# Patient Record
(demographics unavailable — no encounter records)

---

## 2025-05-02 NOTE — PHYSICAL EXAM
[TextEntry] : GENERAL: Appears in no acute distress  HEENT: EOMI, PERRLA. No conjunctival erythema. Moist mucous membranes. No nasopharyngeal ulcers  NECK: Supple, no cervical lymphadenopathy, no thyromegaly  CARDIOVASCULAR: RRR. S1, S2 auscultated. No murmurs or rubs.  PULMONARY: Clear to auscultation b/l, no wheezes, rales, or crackles  ABDOMINAL: Soft, nontender, nondistended. Bowel sounds present. No organomegaly.  MSK:  No active synovitis, swelling, erythema, or warmth.  No joint tenderness to palpation.  Positive Finkelstein's test of L hand Positive L shoulder RCT maneuvers Trigger fingers of L1st digit and 3rd digit palpable SKIN: No lesions or rashes  NEURO: No focal deficits. PSYCH: AAOx3. Normal affect and thought process.

## 2025-05-02 NOTE — REASON FOR VISIT
[Initial Evaluation] : an initial evaluation [Language Line ] : provided by Language Line   [Interpreters_IDNumber] : [id number] [Interpreters_FullName] : Osmany [TWNoteComboBox1] : [place]

## 2025-05-02 NOTE — HISTORY OF PRESENT ILLNESS
[FreeTextEntry1] : 49 y/o female w/ DM referred to rheumatology for pain in shoulder and hand. Pt has been having pain in b/l hands, 1st thumb, L shoulder x 8 months. Pt reports dropping things due to the issue. Pt had XRs in hospital but does not know results. Pt has not been given any meds for pain. Pt does housework regularly.

## 2025-05-02 NOTE — ASSESSMENT
[FreeTextEntry1] : 49 y/o female w/ DM referred to rheumatology for pain in shoulder and hand. Pt has been having pain in b/l hands, 1st thumb, L shoulder x 8 months. Pt reports dropping things due to the issue. Pt had XRs in hospital but does not know results. Pt has not been given any meds for pain. Pt does housework regularly.  Patient has L shoulder RCT, L wrist de Quervein's tenosynovitis, and L 1st and 3rd digit trigger fingers by exam. Treatment is complicated by her diabetes.  - L 1st and 3rd digits trigger finger injections today. See procedure note. - Advised to watch her diabetes given that steroid can increase glucose levels in blood. - Refer to ortho hand for continued evaluation and treatment of her trigger fingers, de Quervein's, and L shoulder RCT - For now, OTC NSAIDs and Tylenol PRN pains. - RTC PRN

## 2025-05-02 NOTE — PROCEDURE
[Today's Date:] : Date: [unfilled] [Risks] : risks [Benefits] : benefits [Alternatives] : alternatives [Consent Obtained] : written consent was obtained prior to the procedure and is detailed in the patient's record [Patient] : Prior to the start of the procedure a time out was taken and the identity of the patient was confirmed via name and date of birth with the patient. The correct site and the procedure to be performed were confirmed. The correct side was confirmed if applicable. The availability of the correct equipment was verified [Therapeutic] : therapeutic [#1 Site: ______] : #1 site identified in the [unfilled] [Ethyl Chloride] : ethyl chloride [Alcohol] : alcohol [Tolerated Well] : the patient tolerated the procedure well [No Complications] : there were no complications [Instructions Given] : handouts/patient instructions were given to patient [Patient Instructed to Call] : patient was instructed to call if redness at site, a decrease in range of motion or an increase in pain is noted after procedure. [de-identified] : 30 gauge 1 inch [de-identified] : 0.5ml (20mg) methylprednisolone, 0.5ml 1% lidocaine

## 2025-05-05 NOTE — PROCEDURE
[FreeTextEntry1] :  Verbal consent given after informed discussion regarding risks, benefits and alternatives to a corticosteroid injection.  Risks including infection, flare reaction, pain, and skin changes, fat atrophy, depigmentation, elevation in blood glucose were discussed in detail.  The left wrist was confirmed to be the correct site.  The skin was anesthetized using of the chloride solution spray and prepped with alcohol.  An injection was performed using 6 mg of betamethasone mixed with 1 cc of 1% lidocaine into the left wrist first dorsal compartment. the cyst was punctured upon doing so.  The patient tolerated the procedure well.  Advised to ice and elevate tonight and that the full effects can take 48 to 72 hours.

## 2025-05-05 NOTE — HISTORY OF PRESENT ILLNESS
[FreeTextEntry1] : 48-year-old female presenting for evaluation left wrist left thumb and left middle finger pain.  She states is been ongoing for approximately 2 months.  She was seen by rheumatologist 3 days ago she was given injection for her left thumb and middle finger trigger fingers.  She reports does not have any significant relief at this time..  She also reports pain on her radial aspect of her left wrist.  She reports he has a cyst in this region as well.  She is otherwise healthy.  There is no history of injury or trauma.  She does have a history of diabetes.  She is a diabetic pump.  She did not have elevations in her blood glucose after her prior injections  Today's exam accomplished with the aid of a , Rasheeda Ceron

## 2025-05-05 NOTE — ASSESSMENT
[FreeTextEntry1] : 1.  Left de Quervain's tenosynovitis with overlying ganglion cyst -We reviewed de Quervain's tenosynovitis and different treatment options.  We discussed how it is related to inflammation of the first dorsal compartment.  Given the acuity and treatment thus far I recommended bracing with a thumb spica brace to wear when at rest or at night for a 3 to 4-week duration, anti-inflammatories.  We also discussed the role of corticosteroid injection and associated risks benefits, and alternatives. - She underwent corticosteroid injection as well as puncture of the cyst we discussed there is a very high recurrence rate with puncture of the cyst alone - She is advised to check her blood sugar tonight she had does have a pump - She was given a thumb spica brace to wear at night  2.  Left thumb and middle finger trigger fingers - Patient have any triggering on my examination today.  She does have pain over the A1 pulleys.  She did have a corticosteroid injection 3 days ago.  We discussed that symptoms for improvement after injection can take up to a week to see improvement.  I recommend observation at this time.  Follow-up 6-week

## 2025-05-05 NOTE — PHYSICAL EXAM
[de-identified] : Left hand and wrist  -Hand and digits warm well-perfused, no abrasion, redness or drainage - mild swelling over 1st dorsal compartment, there is a palpable ganglion cyst superficially over the compartment -No tenderness over thumb CMC, negative CMC grind,  -Positive Finkelstein's test, pain over the first dorsal compartment. -Patient able to make full composite fist.  Denies numbness or tingling in the radial, median, ulnar nerve distributions  Mild tenderness in the thumb and middle finger A1 pulley.  No significant clicking or locking on examination today.  Prior puncture site from the needle intact. [de-identified] : Three-view x-ray of the left wrist were taken today, including AP lateral oblique these were personally reviewed  these demonstrate: No fractures, no bony lesions evident.  No significant degenerative change